# Patient Record
Sex: FEMALE | Race: WHITE | NOT HISPANIC OR LATINO | URBAN - METROPOLITAN AREA
[De-identification: names, ages, dates, MRNs, and addresses within clinical notes are randomized per-mention and may not be internally consistent; named-entity substitution may affect disease eponyms.]

---
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
## 2025-03-22 ENCOUNTER — APPOINTMENT (OUTPATIENT)
Age: 20
End: 2025-03-22

## 2025-05-14 DIAGNOSIS — E66.813 OBESITY, CLASS 3: ICD-10-CM

## 2025-05-28 ENCOUNTER — NON-APPOINTMENT (OUTPATIENT)
Age: 20
End: 2025-05-28

## 2025-06-13 ENCOUNTER — APPOINTMENT (OUTPATIENT)
Dept: SURGERY | Facility: CLINIC | Age: 20
End: 2025-06-13

## 2025-07-08 ENCOUNTER — EMERGENCY (EMERGENCY)
Facility: HOSPITAL | Age: 20
LOS: 0 days | Discharge: HOME | End: 2025-07-08
Attending: EMERGENCY MEDICINE
Payer: COMMERCIAL

## 2025-07-08 VITALS
HEIGHT: 66 IN | TEMPERATURE: 99 F | OXYGEN SATURATION: 98 % | DIASTOLIC BLOOD PRESSURE: 79 MMHG | SYSTOLIC BLOOD PRESSURE: 122 MMHG | WEIGHT: 261.91 LBS | HEART RATE: 71 BPM | RESPIRATION RATE: 20 BRPM

## 2025-07-08 DIAGNOSIS — M54.50 LOW BACK PAIN, UNSPECIFIED: ICD-10-CM

## 2025-07-08 DIAGNOSIS — R10.813 RIGHT LOWER QUADRANT ABDOMINAL TENDERNESS: ICD-10-CM

## 2025-07-08 DIAGNOSIS — Z88.7 ALLERGY STATUS TO SERUM AND VACCINE: ICD-10-CM

## 2025-07-08 DIAGNOSIS — R30.0 DYSURIA: ICD-10-CM

## 2025-07-08 DIAGNOSIS — Z90.89 ACQUIRED ABSENCE OF OTHER ORGANS: Chronic | ICD-10-CM

## 2025-07-08 DIAGNOSIS — Z98.890 OTHER SPECIFIED POSTPROCEDURAL STATES: Chronic | ICD-10-CM

## 2025-07-08 DIAGNOSIS — E28.2 POLYCYSTIC OVARIAN SYNDROME: ICD-10-CM

## 2025-07-08 DIAGNOSIS — Z87.42 PERSONAL HISTORY OF OTHER DISEASES OF THE FEMALE GENITAL TRACT: ICD-10-CM

## 2025-07-08 DIAGNOSIS — R10.31 RIGHT LOWER QUADRANT PAIN: ICD-10-CM

## 2025-07-08 LAB
ALBUMIN SERPL ELPH-MCNC: 4.1 G/DL — SIGNIFICANT CHANGE UP (ref 3.5–5.2)
ALP SERPL-CCNC: 75 U/L — SIGNIFICANT CHANGE UP (ref 30–115)
ALT FLD-CCNC: 16 U/L — SIGNIFICANT CHANGE UP (ref 14–37)
ANION GAP SERPL CALC-SCNC: 16 MMOL/L — HIGH (ref 7–14)
APPEARANCE UR: ABNORMAL
AST SERPL-CCNC: 20 U/L — SIGNIFICANT CHANGE UP (ref 14–37)
BASOPHILS # BLD AUTO: 0.04 K/UL — SIGNIFICANT CHANGE UP (ref 0–0.2)
BASOPHILS NFR BLD AUTO: 0.4 % — SIGNIFICANT CHANGE UP (ref 0–1)
BILIRUB SERPL-MCNC: 0.2 MG/DL — SIGNIFICANT CHANGE UP (ref 0.2–1.2)
BILIRUB UR-MCNC: NEGATIVE — SIGNIFICANT CHANGE UP
BUN SERPL-MCNC: 13 MG/DL — SIGNIFICANT CHANGE UP (ref 10–20)
CALCIUM SERPL-MCNC: 8.8 MG/DL — SIGNIFICANT CHANGE UP (ref 8.4–10.5)
CHLORIDE SERPL-SCNC: 107 MMOL/L — SIGNIFICANT CHANGE UP (ref 98–110)
CO2 SERPL-SCNC: 18 MMOL/L — SIGNIFICANT CHANGE UP (ref 17–32)
COLOR SPEC: SIGNIFICANT CHANGE UP
CREAT SERPL-MCNC: 0.9 MG/DL — SIGNIFICANT CHANGE UP (ref 0.7–1.5)
DIFF PNL FLD: ABNORMAL
EGFR: 94 ML/MIN/1.73M2 — SIGNIFICANT CHANGE UP
EGFR: 94 ML/MIN/1.73M2 — SIGNIFICANT CHANGE UP
EOSINOPHIL # BLD AUTO: 0.14 K/UL — SIGNIFICANT CHANGE UP (ref 0–0.7)
EOSINOPHIL NFR BLD AUTO: 1.5 % — SIGNIFICANT CHANGE UP (ref 0–8)
GLUCOSE SERPL-MCNC: 76 MG/DL — SIGNIFICANT CHANGE UP (ref 70–99)
GLUCOSE UR QL: NEGATIVE MG/DL — SIGNIFICANT CHANGE UP
HCT VFR BLD CALC: 37.1 % — SIGNIFICANT CHANGE UP (ref 37–47)
HGB BLD-MCNC: 12.1 G/DL — SIGNIFICANT CHANGE UP (ref 12–16)
IMM GRANULOCYTES NFR BLD AUTO: 0.5 % — HIGH (ref 0.1–0.3)
KETONES UR QL: ABNORMAL MG/DL
LEUKOCYTE ESTERASE UR-ACNC: ABNORMAL
LIDOCAIN IGE QN: 21 U/L — SIGNIFICANT CHANGE UP (ref 7–60)
LYMPHOCYTES # BLD AUTO: 1.99 K/UL — SIGNIFICANT CHANGE UP (ref 1.2–3.4)
LYMPHOCYTES # BLD AUTO: 21.6 % — SIGNIFICANT CHANGE UP (ref 20.5–51.1)
MCHC RBC-ENTMCNC: 28.9 PG — SIGNIFICANT CHANGE UP (ref 27–31)
MCHC RBC-ENTMCNC: 32.6 G/DL — SIGNIFICANT CHANGE UP (ref 32–37)
MCV RBC AUTO: 88.5 FL — SIGNIFICANT CHANGE UP (ref 81–99)
MONOCYTES # BLD AUTO: 0.56 K/UL — SIGNIFICANT CHANGE UP (ref 0.1–0.6)
MONOCYTES NFR BLD AUTO: 6.1 % — SIGNIFICANT CHANGE UP (ref 1.7–9.3)
NEUTROPHILS # BLD AUTO: 6.43 K/UL — SIGNIFICANT CHANGE UP (ref 1.4–6.5)
NEUTROPHILS NFR BLD AUTO: 69.9 % — SIGNIFICANT CHANGE UP (ref 42.2–75.2)
NITRITE UR-MCNC: NEGATIVE — SIGNIFICANT CHANGE UP
NRBC BLD AUTO-RTO: 0 /100 WBCS — SIGNIFICANT CHANGE UP (ref 0–0)
PH UR: 6 — SIGNIFICANT CHANGE UP (ref 5–8)
PLATELET # BLD AUTO: 257 K/UL — SIGNIFICANT CHANGE UP (ref 130–400)
PMV BLD: 11.2 FL — HIGH (ref 7.4–10.4)
POTASSIUM SERPL-MCNC: 4.5 MMOL/L — SIGNIFICANT CHANGE UP (ref 3.5–5)
POTASSIUM SERPL-SCNC: 4.5 MMOL/L — SIGNIFICANT CHANGE UP (ref 3.5–5)
PROT SERPL-MCNC: 6.7 G/DL — SIGNIFICANT CHANGE UP (ref 6–8)
PROT UR-MCNC: 100 MG/DL
RBC # BLD: 4.19 M/UL — LOW (ref 4.2–5.4)
RBC # FLD: 12.2 % — SIGNIFICANT CHANGE UP (ref 11.5–14.5)
SODIUM SERPL-SCNC: 141 MMOL/L — SIGNIFICANT CHANGE UP (ref 135–146)
SP GR SPEC: >1.03 — HIGH (ref 1–1.03)
UROBILINOGEN FLD QL: 1 MG/DL — SIGNIFICANT CHANGE UP (ref 0.2–1)
WBC # BLD: 9.21 K/UL — SIGNIFICANT CHANGE UP (ref 4.8–10.8)
WBC # FLD AUTO: 9.21 K/UL — SIGNIFICANT CHANGE UP (ref 4.8–10.8)

## 2025-07-08 PROCEDURE — 87086 URINE CULTURE/COLONY COUNT: CPT

## 2025-07-08 PROCEDURE — 76830 TRANSVAGINAL US NON-OB: CPT

## 2025-07-08 PROCEDURE — 76830 TRANSVAGINAL US NON-OB: CPT | Mod: 26

## 2025-07-08 PROCEDURE — 36000 PLACE NEEDLE IN VEIN: CPT | Mod: XU

## 2025-07-08 PROCEDURE — 99285 EMERGENCY DEPT VISIT HI MDM: CPT

## 2025-07-08 PROCEDURE — 36415 COLL VENOUS BLD VENIPUNCTURE: CPT

## 2025-07-08 PROCEDURE — 80053 COMPREHEN METABOLIC PANEL: CPT

## 2025-07-08 PROCEDURE — 85025 COMPLETE CBC W/AUTO DIFF WBC: CPT

## 2025-07-08 PROCEDURE — 81025 URINE PREGNANCY TEST: CPT

## 2025-07-08 PROCEDURE — 74177 CT ABD & PELVIS W/CONTRAST: CPT | Mod: 26

## 2025-07-08 PROCEDURE — 83690 ASSAY OF LIPASE: CPT

## 2025-07-08 PROCEDURE — 74177 CT ABD & PELVIS W/CONTRAST: CPT

## 2025-07-08 PROCEDURE — 81001 URINALYSIS AUTO W/SCOPE: CPT

## 2025-07-08 PROCEDURE — 99284 EMERGENCY DEPT VISIT MOD MDM: CPT | Mod: 25

## 2025-07-08 RX ORDER — SODIUM CHLORIDE 9 G/1000ML
1000 INJECTION, SOLUTION INTRAVENOUS ONCE
Refills: 0 | Status: COMPLETED | OUTPATIENT
Start: 2025-07-08 | End: 2025-07-08

## 2025-07-08 RX ORDER — IOHEXOL 350 MG/ML
30 INJECTION, SOLUTION INTRAVENOUS ONCE
Refills: 0 | Status: DISCONTINUED | OUTPATIENT
Start: 2025-07-08 | End: 2025-07-08

## 2025-07-08 RX ORDER — CEPHALEXIN 250 MG/1
1 CAPSULE ORAL
Qty: 14 | Refills: 0
Start: 2025-07-08 | End: 2025-07-14

## 2025-07-08 RX ADMIN — SODIUM CHLORIDE 1000 MILLILITER(S): 9 INJECTION, SOLUTION INTRAVENOUS at 15:30

## 2025-07-08 NOTE — ED PROVIDER NOTE - ATTENDING CONTRIBUTION TO CARE
20-year-old female with history of gastric sleeve about 4 years ago, history of PCOS, history of ovarian cyst bilaterally, never required surgery for back, now presenting with right lower quadrant abdominal and right lower back pain since this morning.  Patient also feels that she cannot completely empty her bladder.  Patient complains of abdominal pain when urinating, but no dysuria.  No constipation but patient had diarrhea since this morning.  No fever.  No vomiting.  Pain does not radiate from the abdomen around her back but she also feels it in the lower back at the same time.  Exam - Gen - NAD, Head - NCAT, Pharynx - clear, MMM, Heart - RRR, no m/g/r, Lungs - CTAB, no w/c/r, Abdomen - soft, (+) suprapubic and right lower quadrant tenderness, ND, back–mild right lower back tenderness, no CVA tenderness, skin - No rash, Extremities - FROM, no edema, erythema, ecchymosis, Neuro - CN 2-12 intact, nl strength and sensation, nl gait.  Plan–labs, ultrasound pelvis to rule out ovarian pathology, CT abdomen and pelvis to evaluate for appendicitis. 20-year-old female with history of gastric sleeve about 4 years ago, history of PCOS, history of ovarian cyst bilaterally, never required surgery for back, now presenting with right lower quadrant abdominal and right lower back pain since this morning.  Patient also feels that she cannot completely empty her bladder.  Patient complains of abdominal pain when urinating, but no dysuria.  No constipation but patient had diarrhea since this morning.  No fever.  No vomiting.  Pain does not radiate from the abdomen around her back but she also feels it in the lower back at the same time.  Exam - Gen - NAD, Head - NCAT, Pharynx - clear, MMM, Heart - RRR, no m/g/r, Lungs - CTAB, no w/c/r, Abdomen - soft, (+) suprapubic and right lower quadrant tenderness, ND, back–mild right lower back tenderness, no CVA tenderness, skin - No rash, Extremities - FROM, no edema, erythema, ecchymosis, Neuro - CN 2-12 intact, nl strength and sensation, nl gait.  Plan–labs, ultrasound pelvis to rule out ovarian pathology, CT abdomen and pelvis to evaluate for appendicitis. Pelvic ultrasound within normal limits.  CT abdomen pelvis with no abdominal pelvic pathology except for the noted to hepatic enhancing structures, potentially hemangiomas.  Radiology recommended outpatient MRI abdomen with IV contrast for further evaluation.  Urine with elevated white cells, blood and bacteria though slightly contaminated with epithelial cells noted.  Patient however symptomatic with incomplete emptying of her bladder, will discharge home with oral antibiotics for potential UTI.  Advised follow-up with PMD and GI outpatient.  Given return precautions.

## 2025-07-08 NOTE — ED PEDIATRIC NURSE REASSESSMENT NOTE - NS ED NURSE REASSESS COMMENT FT2
Recived patient from day nurse to continue with plan of care. Patient is aaox3 and ambulatory at baseline. Awake and resting on stretcher; chest rise and fall noted. Awaiting CT read.

## 2025-07-08 NOTE — ED PROVIDER NOTE - NSFOLLOWUPINSTRUCTIONS_ED_ALL_ED_FT
-Please follow up with your PMD in 1-3 days.   - Please take Keflex 500 mg BID for 7 days for potential UTI.   - Please discuss CT results and advised further workup outpatient as recommended by PMD  CT results are below:    IMPRESSION:    1. No evidence of acute abdominal pathology.    2. Two hepatic heterogeneously enhancing structures, larger measuring 2.3   cm segment IVb . Considerations include hemangiomas, and further   evaluation can be obtained with outpatient MR abdomen with IV contrast.    Abdominal pain can have many causes, ranging from minor indigestion to serious medical conditions. Managing abdominal pain depends on the underlying cause, but here are some general strategies and when to seek medical attention:    **Identifying the Location and Type of Pain:**    * **Location:** Where is the pain located? (Upper, lower, left, right, center)  * **Type:** Is it sharp, dull, cramping, burning, or achy?  * **Duration:** How long has the pain been present?  * **Frequency:** How often does the pain occur?  * **Associated symptoms:** Are there any other symptoms like nausea, vomiting, diarrhea, constipation, fever, or changes in bowel habits?    **Home Management for Mild to Moderate Pain:**    * **Rest:** Avoid strenuous activity.  * **Heat application:** A warm compress or heating pad may help relieve some types of abdominal pain.  * **Hydration:** Sip clear fluids like water, broth, or electrolyte solutions to prevent dehydration, especially if vomiting or diarrhea is present.  * **[place] diet (BRAT diet):** Bananas, rice, applesauce, and toast can be easy to digest and help with upset stomachs.  * **Over-the-counter medications:**      * **Pain relievers:** Acetaminophen (Tylenol) or ibuprofen (Advil, Motrin) can help reduce pain and fever. *Follow dosage instructions carefully.*  Avoid aspirin, as it can irritate the stomach.      * **Antacids:** For heartburn or indigestion.      * **Anti-diarrheal medications:** If diarrhea is present.  Use with caution and only for a short period, as they can sometimes worsen the underlying condition.  * **Avoid gas-producing foods:**  Beans, lentils, broccoli, and carbonated drinks can worsen gas and bloating.  * **Probiotics:** May help restore the balance of gut bacteria and alleviate some digestive discomfort.    **When to Seek Immediate Medical Attention:**    * **Sudden, severe pain:**  * **Pain that gets progressively worse:**  * **Bloody stools (black or bright red):**  * **High fever:**  * **Persistent vomiting or diarrhea:**  * **Inability to pass gas or have a bowel movement:**  * **Swelling or tenderness of the abdomen:**  * **Pain that radiates to the back or shoulder:**  * **Yellowing of the skin or eyes (jaundice):**  * **Difficulty breathing:**  * **Signs of shock (rapid heart rate, dizziness, confusion):**  * **Pregnancy-related abdominal pain:**    **When to See a Doctor (Non-Emergency):**    * **Recurring abdominal pain:**  * **Unexplained weight loss:**  * **Changes in bowel habits:**  * **Pain that interferes with daily activities:**  * **Pain that lasts longer than a few days:**    **Important Considerations:**    * **Self-treating abdominal pain can be risky:** If you're unsure of the cause, it's always best to consult a healthcare professional.  * **Don't overuse over-the-counter medications:** Always follow the recommended dosage and duration.  * **Underlying medical conditions:**  Abdominal pain can be a symptom of many different medical conditions, some of which can be serious.  A doctor can help determine the underlying cause and recommend appropriate treatment.    **This information is for general knowledge and does not constitute medical advice. Always consult with a healthcare professional before making any decisions about your health or treatment.**

## 2025-07-08 NOTE — ED PROVIDER NOTE - PHYSICAL EXAMINATION
GENERAL: Well-developed; well-nourished; in no acute distress. AO  SKIN: warm, well perfused  HEAD: Normocephalic; atraumatic.  EYES: no conjunctival erythema, ocular motions intact and appropriate  ENT: airway clear.  CARD: Regular rate and rhythm.   RESP: LCTAB; No wheezes or crackles  ABD: soft and nondistended. RLQ ttp. no CVA ttp  Upper EXT: moving b/l UEs. Rad pulses 2+ symm, sensation intact and symm  Lower EXT: moving b/l LEs, sensation intact and symm

## 2025-07-08 NOTE — ED PROVIDER NOTE - CLINICAL SUMMARY MEDICAL DECISION MAKING FREE TEXT BOX
20-year-old female with history of gastric sleeve about 4 years ago, history of PCOS, history of ovarian cyst bilaterally, never required surgery for back, now presenting with right lower quadrant abdominal and right lower back pain since this morning.  Patient also feels that she cannot completely empty her bladder.  Patient complains of abdominal pain when urinating, but no dysuria.  No constipation but patient had diarrhea since this morning.  No fever.  No vomiting.  Pain does not radiate from the abdomen around her back but she also feels it in the lower back at the same time.  Exam - Gen - NAD, Head - NCAT, Pharynx - clear, MMM, Heart - RRR, no m/g/r, Lungs - CTAB, no w/c/r, Abdomen - soft, (+) suprapubic and right lower quadrant tenderness, ND, back–mild right lower back tenderness, no CVA tenderness, skin - No rash, Extremities - FROM, no edema, erythema, ecchymosis, Neuro - CN 2-12 intact, nl strength and sensation, nl gait.  Plan–labs, ultrasound pelvis to rule out ovarian pathology, CT abdomen and pelvis to evaluate for appendicitis. Pelvic ultrasound within normal limits.  CT abdomen pelvis with no abdominal pelvic pathology except for the noted to hepatic enhancing structures, potentially hemangiomas.  Radiology recommended outpatient MRI abdomen with IV contrast for further evaluation.  Urine with elevated white cells, blood and bacteria though slightly contaminated with epithelial cells noted.  Patient however symptomatic with incomplete emptying of her bladder, will discharge home with oral antibiotics for potential UTI.  Advised follow-up with PMD and GI outpatient.  Given return precautions.

## 2025-07-08 NOTE — ED PROVIDER NOTE - OBJECTIVE STATEMENT
20-year-old female, past medical history of gastric sleeve about 4 years ago, PCOS, ovarian cyst bilaterally, comes in for right lower quadrant pain and right lower back pain that started this morning.  Patient states that she woke up this morning, started to have lower back pain and thought it was musculoskeletal, then it started to hurt in the right lower quadrant which prompted her to come in.  Patient also endorses pain with a little bit of difficulty urinating.  Denies fevers, chills, chest pain, shortness of breath, hematuria, vaginal discharge.  Patient last menstrual period over 1 month ago, sexually active with 1 male partner, no concerns of STDs or STIs.

## 2025-07-08 NOTE — ED PROVIDER NOTE - PATIENT PORTAL LINK FT
You can access the FollowMyHealth Patient Portal offered by [facility] by registering at the following website: [email or web address]. By joining [facility] FollowMyHealth portal, you will also be able to view your health information using other applications (apps) compatible with our system.

## 2025-07-17 ENCOUNTER — APPOINTMENT (OUTPATIENT)
Dept: SURGERY | Facility: CLINIC | Age: 20
End: 2025-07-17

## 2025-08-14 ENCOUNTER — APPOINTMENT (OUTPATIENT)
Dept: SURGERY | Facility: CLINIC | Age: 20
End: 2025-08-14
Payer: COMMERCIAL

## 2025-08-14 VITALS
HEIGHT: 66.7 IN | TEMPERATURE: 97 F | SYSTOLIC BLOOD PRESSURE: 116 MMHG | OXYGEN SATURATION: 98 % | HEART RATE: 87 BPM | DIASTOLIC BLOOD PRESSURE: 78 MMHG

## 2025-08-14 VITALS — BODY MASS INDEX: 41.09 KG/M2 | WEIGHT: 260 LBS

## 2025-08-14 DIAGNOSIS — E66.813 OBESITY, CLASS 3: ICD-10-CM

## 2025-08-14 DIAGNOSIS — Z87.42 PERSONAL HISTORY OF OTHER DISEASES OF THE FEMALE GENITAL TRACT: ICD-10-CM

## 2025-08-14 PROCEDURE — 99204 OFFICE O/P NEW MOD 45 MIN: CPT

## 2025-08-14 RX ORDER — TIRZEPATIDE 2.5 MG/.5ML
2.5 INJECTION, SOLUTION SUBCUTANEOUS
Qty: 1 | Refills: 0 | Status: ACTIVE | COMMUNITY
Start: 2025-08-14 | End: 1900-01-01

## 2025-08-14 RX ORDER — TIRZEPATIDE 5 MG/.5ML
5 INJECTION, SOLUTION SUBCUTANEOUS
Qty: 1 | Refills: 2 | Status: ACTIVE | COMMUNITY
Start: 2025-08-14 | End: 1900-01-01

## 2025-08-15 ENCOUNTER — NON-APPOINTMENT (OUTPATIENT)
Age: 20
End: 2025-08-15

## 2025-08-26 ENCOUNTER — APPOINTMENT (OUTPATIENT)
Dept: OTOLARYNGOLOGY | Facility: CLINIC | Age: 20
End: 2025-08-26